# Patient Record
Sex: MALE | Race: WHITE | URBAN - METROPOLITAN AREA
[De-identification: names, ages, dates, MRNs, and addresses within clinical notes are randomized per-mention and may not be internally consistent; named-entity substitution may affect disease eponyms.]

---

## 2017-04-21 ENCOUNTER — OFFICE VISIT (OUTPATIENT)
Dept: UROLOGY | Age: 22
End: 2017-04-21

## 2017-04-21 VITALS
DIASTOLIC BLOOD PRESSURE: 80 MMHG | HEART RATE: 100 BPM | WEIGHT: 151 LBS | TEMPERATURE: 98.6 F | SYSTOLIC BLOOD PRESSURE: 120 MMHG | BODY MASS INDEX: 23.7 KG/M2 | OXYGEN SATURATION: 98 % | HEIGHT: 67 IN

## 2017-04-21 DIAGNOSIS — B35.6 TINEA CRURIS: ICD-10-CM

## 2017-04-21 DIAGNOSIS — N48.9 PENILE LESION: ICD-10-CM

## 2017-04-21 DIAGNOSIS — B08.1 MOLLUSCUM CONTAGIOSUM INFECTION: Primary | ICD-10-CM

## 2017-04-21 LAB
BILIRUB UR QL STRIP: NEGATIVE
GLUCOSE UR-MCNC: NEGATIVE MG/DL
KETONES P FAST UR STRIP-MCNC: NEGATIVE MG/DL
PH UR STRIP: 7 [PH] (ref 4.6–8)
PROT UR QL STRIP: NEGATIVE MG/DL
SP GR UR STRIP: 1.02 (ref 1–1.03)
UA UROBILINOGEN AMB POC: NORMAL (ref 0.2–1)
URINALYSIS CLARITY POC: CLEAR
URINALYSIS COLOR POC: YELLOW
URINE BLOOD POC: NORMAL
URINE LEUKOCYTES POC: NEGATIVE
URINE NITRITES POC: NEGATIVE

## 2017-04-21 NOTE — PROGRESS NOTES
With actually 3 complaints. Chief Complaint   Patient presents with    New Patient    Penile Lesion       HISTORY OF PRESENT ILLNESS:  Tessa Voss is a 23 y.o. male who presents today #1, he has some small whitish flesh-colored bumps on his penis suggestive started over the last couple weeks, #2 he has erythematous area over his left inguinal canal which he describes as a bruise but is nontender, and #3, when he has intercourse and ejaculation his testicles rise up bilaterally and he has to push them back down. He has no problems with erections, no testicular pain and no other urinary symptoms. ROS he has been in good health see that on the chart for details. Past Medical History:   Diagnosis Date    Asthma        History reviewed. No pertinent surgical history. Social History   Substance Use Topics    Smoking status: Never Smoker    Smokeless tobacco: None    Alcohol use No       No Known Allergies    History reviewed. No pertinent family history. PHYSICAL EXAMINATION:   Visit Vitals    /80 (BP 1 Location: Left arm, BP Patient Position: Sitting)    Pulse 100    Temp 98.6 °F (37 °C) (Oral)    Ht 5' 7\" (1.702 m)    Wt 151 lb (68.5 kg)    SpO2 98%    BMI 23.65 kg/m2     Constitutional: WDWN, Pleasant and appropriate affect, No acute distress. CV:  No peripheral swelling noted  Respiratory: No respiratory distress or difficulties  Abdomen:  No abdominal masses or tenderness. No CVA tenderness. No inguinal hernias noted. Over the left inguinal canal he has a circular hyperemic area of skin and I think is tinea .  Male:    SADI: Deferred today. SCROTUM:  No scrotal rash or lesions noticed. Normal bilateral testes and epididymis. PENIS: He has a number of small lesions, the largest is probably 2 mm on the shaft of his penis, all of these are raised painless and have a little dimple at the apex consistent with molluscum contagiosum. Skin: No jaundice. Neuro/Psych:  Alert and oriented x 3, affect appropriate. Lymphatic:   No enlarged inguinal lymph nodes. Results for orders placed or performed in visit on 04/21/17   AMB POC URINALYSIS DIP STICK AUTO W/O MICRO   Result Value Ref Range    Color (UA POC) Yellow     Clarity (UA POC) Clear     Glucose (UA POC) Negative Negative    Bilirubin (UA POC) Negative Negative    Ketones (UA POC) Negative Negative    Specific gravity (UA POC) 1.020 1.001 - 1.035    Blood (UA POC) Trace Negative    pH (UA POC) 7.0 4.6 - 8.0    Protein (UA POC) Negative Negative mg/dL    Urobilinogen (UA POC) 1 mg/dL 0.2 - 1    Nitrites (UA POC) Negative Negative    Leukocyte esterase (UA POC) Negative Negative         REVIEW OF LABS AND IMAGING: There are no images to review. Imaging Report Reviewed? NO     Images Reviewed? NO           Other Lab Data Reviewed? YES         ASSESSMENT:     ICD-10-CM ICD-9-CM    1. Molluscum contagiosum infection B08.1 078.0    2. Tinea cruris B35.6 110.3    3. Penile lesion N48.9 607.89 AMB POC URINALYSIS DIP STICK AUTO W/O MICRO            PLAN / DISCUSSION: : I have explained all these concerns to him, first of all he has taken a curious of the penis and I have reassured him that these do not turn into anything serious and are usually self-limited and go away with time and are caused by a virus. His second concern is I think a fungal infection in the groin area and I have recommended he get some over-the-counter Tinactin for that. And his third area of concern was the testicular motion and I assured him that that I think is a hyperactive cremaster reflex and is perfectly harmless. All of his questions and concerns were answered. Return as needed. The patient expresses understanding and agreement of the discussion and plan. Francisco Don MD on 4/21/2017         Please note: This document has been produced using voice recognition software.  Unrecognized errors in transcription may be present.

## 2017-04-21 NOTE — PROGRESS NOTES
Mr. Matt Ross has a reminder for a \"due or due soon\" health maintenance. I have asked that he contact his primary care provider for follow-up on this health maintenance.

## 2017-04-21 NOTE — MR AVS SNAPSHOT
Visit Information Date & Time Provider Department Dept. Phone Encounter #  
 4/21/2017  2:15 PM Sachin Mack, [facility] [phone] [id number] Your Appointments 4/21/2017  2:15 PM  
New Patient with MD SAMEER Michele [facility]) Appt Note: penis/testicular pain [address] [place] [address] [address]  
[phone] [address] [facility] [address] Upcoming Health Maintenance Date Due Hepatitis A Peds Age 1-18 (1 of 2 - Standard Series) 1/17/1998 DTaP/Tdap/Td series (1 - Tdap) 1/17/2004 HPV AGE 9Y-26Y (1 of 3 - Male 3 Dose Series) 1/17/2008 INFLUENZA AGE 9 TO ADULT 8/1/2016 Allergies as of 4/21/2017  Review Complete On: 4/21/2017 By: Mamadou Kurtz No Known Allergies Current Immunizations  Never Reviewed No immunizations on file. Not reviewed this visit You Were Diagnosed With   
  
 Codes Comments Penile lesion    -  Primary ICD-10-CM: N48.9 ICD-9-CM: 607.89 Vitals BP Pulse Temp Height(growth percentile) Weight(growth percentile) SpO2  
 120/80 (BP 1 Location: Left arm, BP Patient Position: Sitting) 100 98.6 °F (37 °C) (Oral) 5' 7\" (1.702 m) 151 lb (68.5 kg) 98% BMI Smoking Status 23.65 kg/m2 Never Smoker Vitals History BMI and BSA Data Body Mass Index Body Surface Area  
 23.65 kg/m 2 1.8 m 2 Your Updated Medication List  
  
Notice  As of 4/21/2017  1:32 PM  
 You have not been prescribed any medications. We Performed the Following AMB POC URINALYSIS DIP STICK AUTO W/O MICRO [37979 CPT(R)] Patient Instructions Testicular Pain: Care Instructions Your Care Instructions Pain in the testicles can be caused by many things. These include an injury to your testicles, an infection, and testicular torsion. Injuries and genital problems most often happen during sports or recreational activities, at work, or in a fall. Pain caused by an injury usually goes away quickly. There is usually no long-term harm to your testicles. Infections that may cause pain include: · An infection of the testicles. This is called orchitis. · An abscess in the scrotum or testicles. · Some sexually transmitted infections (STIs). · A swelling of the tube attached to a testicle. This swelling is called epididymitis. It can cause pain and is sometimes caused by an infection. Testicular torsion happens when a testicle twists on the spermatic cord. This cuts off the blood supply to the testicle. This is a serious condition that requires surgery. Follow-up care is a key part of your treatment and safety. Be sure to make and go to all appointments, and call your doctor if you are having problems. It's also a good idea to know your test results and keep a list of the medicines you take. How can you care for yourself at home? · Rest and protect your testicles and groin. Stop, change, or take a break from any activity that may be causing your pain or soreness. · Put ice or a cold pack on the area for 10 to 20 minutes at a time. Put a thin cloth between the ice and your skin. · Wear briefs, not boxers. Briefs help support the injured area. You can use a jock strap if it helps relieve your pain. · If your doctor prescribed antibiotics, take them as directed. Do not stop taking them just because you feel better. You need to take the full course of antibiotics. · Ask your doctor if you can take an over-the-counter pain medicine, such as acetaminophen (Tylenol), ibuprofen (Advil, Motrin), or naproxen (Aleve). Be safe with medicines. Read and follow all instructions on the label. · If the doctor gave you a prescription medicine for pain, take it as prescribed. When should you call for help? Call your doctor now or seek immediate medical care if: 
· You have severe or increasing pain. · You notice a change in how your testicles look or are positioned in your scrotum. · You notice new or worse swelling in your scrotum. · You have symptoms of a urinary problem, such as a urinary tract infection. These may include: 
¨ Pain or burning when you urinate. ¨ A frequent need to urinate without being able to pass much urine. ¨ Pain in the flank, which is just below the rib cage and above the waist on either side of the back. ¨ Blood in your urine. ¨ A fever. Watch closely for changes in your health, and be sure to contact your doctor if: 
· You do not get better as expected. Where can you learn more? Go to http://jay-erin.info/. Enter S341 in the search box to learn more about \"Testicular Pain: Care Instructions. \" Current as of: August 12, 2016 Content Version: 11.2 © 3167-4580 [employer]. Care instructions adapted under license by [employer] (which disclaims liability or warranty for this information). If you have questions about a medical condition or this instruction, always ask your healthcare professional. [place] [address] any warranty or liability for your use of this information. Introducing [facility]! Quintin Reyes introduces [employer] patient portal. Now you can access parts of your medical record, email your doctor's office, and request medication refills online. 1. In your internet browser, go to [email or web address] 2. Click on the First Time User? Click Here link in the Sign In box. You will see the New Member Sign Up page. 3. Enter your [employer] Access Code exactly as it appears below. You will not need to use this code after youve completed the sign-up process. If you do not sign up before the expiration date, you must request a new code. · [employer] Access Code: [email or web address] Expires: 7/20/2017  1:11 PM 
 
4. Enter the last four digits of your Social Security Number (xxxx) and Date of Birth (mm/dd/yyyy) as indicated and click Submit. You will be taken to the next sign-up page. 5. Create a [employer] ID. This will be your [employer] login ID and cannot be changed, so think of one that is secure and easy to remember. 6. Create a [employer] password. You can change your password at any time. 7. Enter your Password Reset Question and Answer. This can be used at a later time if you forget your password. 8. Enter your e-mail address. You will receive e-mail notification when new information is available in [address] 9. Click Sign Up. You can now view and download portions of your medical record. 10. Click the Download Summary menu link to download a portable copy of your medical information. If you have questions, please visit the Frequently Asked Questions section of the [employer] website. Remember, [employer] is NOT to be used for urgent needs. For medical emergencies, dial 911. Now available from your iPhone and Android! Please provide this summary of care documentation to your next provider. If you have any questions after today's visit, please call [phone].

## 2017-04-21 NOTE — PATIENT INSTRUCTIONS
Testicular Pain: Care Instructions  Your Care Instructions    Pain in the testicles can be caused by many things. These include an injury to your testicles, an infection, and testicular torsion. Injuries and genital problems most often happen during sports or recreational activities, at work, or in a fall. Pain caused by an injury usually goes away quickly. There is usually no long-term harm to your testicles. Infections that may cause pain include:  · An infection of the testicles. This is called orchitis. · An abscess in the scrotum or testicles. · Some sexually transmitted infections (STIs). · A swelling of the tube attached to a testicle. This swelling is called epididymitis. It can cause pain and is sometimes caused by an infection. Testicular torsion happens when a testicle twists on the spermatic cord. This cuts off the blood supply to the testicle. This is a serious condition that requires surgery. Follow-up care is a key part of your treatment and safety. Be sure to make and go to all appointments, and call your doctor if you are having problems. It's also a good idea to know your test results and keep a list of the medicines you take. How can you care for yourself at home? · Rest and protect your testicles and groin. Stop, change, or take a break from any activity that may be causing your pain or soreness. · Put ice or a cold pack on the area for 10 to 20 minutes at a time. Put a thin cloth between the ice and your skin. · Wear briefs, not boxers. Briefs help support the injured area. You can use a jock strap if it helps relieve your pain. · If your doctor prescribed antibiotics, take them as directed. Do not stop taking them just because you feel better. You need to take the full course of antibiotics. · Ask your doctor if you can take an over-the-counter pain medicine, such as acetaminophen (Tylenol), ibuprofen (Advil, Motrin), or naproxen (Aleve). Be safe with medicines.  Read and follow all instructions on the label. · If the doctor gave you a prescription medicine for pain, take it as prescribed. When should you call for help? Call your doctor now or seek immediate medical care if:  · You have severe or increasing pain. · You notice a change in how your testicles look or are positioned in your scrotum. · You notice new or worse swelling in your scrotum. · You have symptoms of a urinary problem, such as a urinary tract infection. These may include:  ¨ Pain or burning when you urinate. ¨ A frequent need to urinate without being able to pass much urine. ¨ Pain in the flank, which is just below the rib cage and above the waist on either side of the back. ¨ Blood in your urine. ¨ A fever. Watch closely for changes in your health, and be sure to contact your doctor if:  · You do not get better as expected. Where can you learn more? Go to http://jay-erin.info/. Enter I267 in the search box to learn more about \"Testicular Pain: Care Instructions. \"  Current as of: August 12, 2016  Content Version: 11.2  © 2689-2598 [employer]. Care instructions adapted under license by [employer] (which disclaims liability or warranty for this information). If you have questions about a medical condition or this instruction, always ask your healthcare professional. [address] any warranty or liability for your use of this information.